# Patient Record
Sex: MALE | Race: WHITE
[De-identification: names, ages, dates, MRNs, and addresses within clinical notes are randomized per-mention and may not be internally consistent; named-entity substitution may affect disease eponyms.]

---

## 2018-08-11 ENCOUNTER — HOSPITAL ENCOUNTER (EMERGENCY)
Dept: HOSPITAL 47 - EC | Age: 43
Discharge: HOME | End: 2018-08-11
Payer: COMMERCIAL

## 2018-08-11 VITALS — RESPIRATION RATE: 16 BRPM | DIASTOLIC BLOOD PRESSURE: 89 MMHG | HEART RATE: 76 BPM | SYSTOLIC BLOOD PRESSURE: 145 MMHG

## 2018-08-11 VITALS — TEMPERATURE: 98.5 F

## 2018-08-11 DIAGNOSIS — S61.412A: Primary | ICD-10-CM

## 2018-08-11 DIAGNOSIS — Z23: ICD-10-CM

## 2018-08-11 DIAGNOSIS — F17.200: ICD-10-CM
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
## 2018-08-11 PROCEDURE — 96372 THER/PROPH/DIAG INJ SC/IM: CPT

## 2018-08-11 PROCEDURE — 73140 X-RAY EXAM OF FINGER(S): CPT

## 2018-08-11 PROCEDURE — 90471 IMMUNIZATION ADMIN: CPT

## 2018-08-11 PROCEDURE — 99283 EMERGENCY DEPT VISIT LOW MDM: CPT

## 2018-08-11 PROCEDURE — 90715 TDAP VACCINE 7 YRS/> IM: CPT

## 2018-08-11 PROCEDURE — 12001 RPR S/N/AX/GEN/TRNK 2.5CM/<: CPT

## 2018-08-11 NOTE — XR
EXAMINATION TYPE: XR finger LT

 

DATE OF EXAM: 8/11/2018

 

COMPARISON: None

 

HISTORY: Pain and laceration

 

TECHNIQUE: Three-view left index finger

 

FINDINGS: Soft tissue injury is over the distal index finger. There is a fracture of the distal tuft.


 

IMPRESSION:

1.  Soft tissue injury with laceration and avulsion of the tuft of the index finger.

## 2018-08-11 NOTE — ED
General Adult HPI





- General


Chief complaint: Wound/Laceration


Stated complaint: lt hand lac


Time Seen by Provider: 08/11/18 17:44


Source: patient, RN notes reviewed


Mode of arrival: ambulatory


Limitations: no limitations





- History of Present Illness


Initial comments: 





Patient is a 43-year-old male presenting to the emergency room today with a 

chief complaint of laceration to the left hand that occurred just prior to 

arrival.  He does admit that he was using at when he accidentally cut the base 

of the left thumb and left index finger.  Patient states some shortness tetanus 

status.  He admits that pain is located to these 2 areas.  He states he does 

have full range of motion.  He denies any other complaints or symptoms. 





- Related Data


 Previous Rx's











 Medication  Instructions  Recorded


 


Cephalexin [Keflex] 500 mg PO Q12HR 10 Days  cap 08/11/18


 


Ibuprofen [Motrin] 800 mg PO Q6HR #30 tab 08/11/18











 Allergies











Allergy/AdvReac Type Severity Reaction Status Date / Time


 


No Known Allergies Allergy   Verified 08/11/18 17:49














Review of Systems


ROS Statement: 


Those systems with pertinent positive or pertinent negative responses have been 

documented in the HPI.





ROS Other: All systems not noted in ROS Statement are negative.





Past Medical History


Past Medical History: No Reported History


History of Any Multi-Drug Resistant Organisms: None Reported


Past Surgical History: No Surgical Hx Reported


Past Psychological History: No Psychological Hx Reported


Smoking Status: Current every day smoker


Past Alcohol Use History: Occasional


Past Drug Use History: None Reported





General Exam





- General Exam Comments


Initial Comments: 





General:  The patient is awake and alert, in no distress, and does not appear 

acutely ill.   


Neck:  The neck is supple, there is no tenderness or JVD.  


Musculoskeletal: Patient does have full range of motion of the left hand and 

digits.  Sensations are intact.  Radial pulses 2+.  Strength 5/5.


Neurological:  A&O x 3. CN II-XII intact, There are no obvious motor or sensory 

deficits. Coordination appears grossly intact. Speech is normal.


Skin:  Skin is warm and dry and no rashes or lesions are noted. 


Psychiatric:  Normal mood and affect.  


Limitations: no limitations





Course


 Vital Signs











  08/11/18





  17:09


 


Temperature 98.5 F


 


Pulse Rate 84


 


Respiratory 18





Rate 


 


Blood Pressure 131/80


 


O2 Sat by Pulse 98





Oximetry 














Procedures





- Procedures


Initial comment: 





Patient does have a 2 cm circumferential laceration to the tip of the left 

index finger.  Approximately 80% round the tip.  No tendon involvement.  Area 

was anesthetized with digital block with 1% lidocaine.  The area was heavily 

irrigated under pressure with saline.  4-0 nylon was used.  A total of 6 

sutures were placed.





Second laceration to the posterior aspect of the left thumb over the DIP joint 

area.  Laceration superficial no active bleeding.  Measures approximately a 

centimeter.  Shows full range of motion.  Strength 5/5 in this area.  There is 

a distal tip abrasion/laceration that is unsuturable.





Medical Decision Making





- Medical Decision Making





X-rays reviewed and does show tuft involvement of the left index finger.  

Patient will be given dose of Ancef here in the emergency room continued on 

antibiotics.  Advised follow up with orthopedics.  Tetanus is been updated.  

Advised to watch for any signs of infection return to emergency room for any 

other concerns.





Disposition


Clinical Impression: 


 Laceration





Disposition: HOME SELF-CARE


Condition: Good


Instructions:  Laceration (ED)


Additional Instructions: 


Please follow-up with orthopedics over the next 2 days.  Please use antibiotics 

and pain medicine as prescribed.  Please continue change dressing twice daily.  

Watch for any signs of infection.  Have sutures removed in 8-10 days.  Return 

to emergency room for any concerns.


Prescriptions: 


Cephalexin [Keflex] 500 mg PO Q12HR 10 Days  cap


Ibuprofen [Motrin] 800 mg PO Q6HR #30 tab


Is patient prescribed a controlled substance at d/c from ED?: No


Referrals: 


None,Stated [Primary Care Provider] - 1-2 days


Joss Poon MD [Medical Doctor] - 1-2 days


Time of Disposition: 19:08

## 2021-07-09 ENCOUNTER — HOSPITAL ENCOUNTER (OUTPATIENT)
Age: 46
Discharge: HOME | End: 2021-07-09
Payer: COMMERCIAL

## 2021-07-09 DIAGNOSIS — R94.31: Primary | ICD-10-CM
